# Patient Record
Sex: FEMALE | Race: WHITE
[De-identification: names, ages, dates, MRNs, and addresses within clinical notes are randomized per-mention and may not be internally consistent; named-entity substitution may affect disease eponyms.]

---

## 2017-12-02 NOTE — CT
CT OF ABDOMEN AND PELVIS PERFORMED WITH INTRAVENOUS CONTRAST ENHANCEMENT:

 

HISTORY: 

Patient with abdominal pain, dysuria, and urine odor that started 3 days ago.  Pain more in the lower
 abdomen.  History of cervical cancer.

 

FINDINGS: 

The lung bases show 2 right lower lobe pulmonary nodules both measuring in the 9 mm range.  There is 
a third nodule which has a central calcification similar in size in the right middle lobe.  No infilt
rative process.

 

The liver appears slightly enlarged.  It measures 25 cm in length but this is partially related to a 
somewhat elongated right lobe.  The spleen is within normal limits.  The pancreas is unremarkable.  A
 small gallstone is identified.

 

Right and left adrenal glands and right and left kidneys are normal in appearance.  There is no signi
ficant periaortic or mesenteric adenopathy.

 

CT OF PELVIS PERFORMED WITH CONTRAST ENHANCEMENT:

The bladder wall is diffusely thickened.  This could be on the basis of an infection or it could be p
ost radiation.  The uterus has been removed.  The appendix was normal.  No significant pelvic lymphad
enopathy.  Small nonspecific inguinal nodes are seen.

 

Slight scoliotic deformity to the spine is noted.

 

IMPRESSION: 

1.  Two noncalcified right lower lobe pulmonary nodules.  I did not have any previous exams available
 for comparison, but a report of a prior 2006 CT does not described any pulmonary nodules and given t
he history of cervical cancer, these have to be viewed with suspicion.  A complete CT of the chest wo
uld be recommended on an nonemergent basis.

2.  Bladder wall thickening could be on the basis of a cystitis.  It could be post radiation in natur
e.

3.  Borderline liver size.

4.  Small gallstone.

5.  Findings were telephoned to Dr. Quiroz at the time of this dictation.

 

 

CODE CR

 

POS: [facility]

## 2018-05-12 NOTE — RAD
AP VIEW OF THE CHEST:

5/12/18

 

INDICATION:

Weakness and dehydration and shortness of breath.

 

FINDINGS:  

There is low lung volumes. There is right basilar atelectasis. No consolidation, pleural effusion or 
pneumothorax evident. No acute osseous abnormality is evident.

 

IMPRESSION:  

Low lung volumes. Right basilar atelectasis. 

 

POS: [facility]

## 2018-06-25 NOTE — RAD
2 VIEWS CHEST:

 

Date:  06/25/18 

 

COMPARISON:  

11/06/17. 

 

HISTORY:  

Pain. 

 

FINDINGS:

No pneumothorax or pleural fluid. No focal consolidation or alveolar edema. Heart and mediastinal con
tours appear grossly unremarkable. 

 

There is a nodule identified within the anterior clear space on the lateral examination. A correlate 
is identified on the right, consistent with a right lower lobe pulmonary nodule, as seen on CT examin
ation performed 12/02/17. 

 

IMPRESSION: 

Right lower lobe pulmonary nodule. This should be further assessed with a complete chest CT on a none
mergent basis. This could be related to benign or malignant disease. No acute findings are seen. 

 

CODE T.

 

 

POS: [facility]

## 2018-06-25 NOTE — ULT
RIGHT UPPER QUADRANT ULTRASOUND: 

 

Date: 6-25-18 

 

Comparison: None. 

 

History: Right upper quadrant pain. 

 

Technique: Multiplanar grayscale sonographic imaging of the right upper quadrant provided. 

 

FINDINGS: 

Imaged pancreas is unremarkable. The pancreas is partially obscured by bowel gas. There is no focal l
iver lesion. The hepatic parenchyma is echogenic and heterogeneous suggesting hepatocellular disease,
 such as steatosis. 

 

The common bile duct measures 4 mm, within normal limits. 

 

There is no gallbladder wall thickening or pericholecystic fluid. The right kidney measures 12.1 cm i
n craniocaudal dimension and demonstrates no stone, hydronephrosis, or mass. 

 

Multiple small echogenic foci are noted within the gallbladder suggesting multiple small stones. 

 

Sonographic Ellison's sign is negative. 

 

IMPRESSION: 

1. Cholelithiasis with sonographic evidence of cholecystitis or biliary dilatation. Findings suggesti
ng probable hepatic steatosis. 

 

POS: [facility]

## 2019-04-18 NOTE — MMO
Bilateral MAMMO Bilat Diag [facility].

 

CLINICAL HISTORY:

Patient is 38 years old and is seen for diagnostic exam. The patient has the

following family history of breast cancer:  father.  The patient has a history

of cervical cancer.

 

VIEWS:

The views performed were:  bilateral craniocaudal with tomosynthesis; bilateral

mediolateral oblique with tomosynthesis; and bilateral mediolateral.

 

MAMMOGRAM FINDINGS:

There are scattered fibroglandular densities.

 

There are no suspicious masses, suspicious calcifications, or new areas of

architectural distortion.

 

IMPRESSION:

THERE IS NO MAMMOGRAPHIC EVIDENCE OF MALIGNANCY.

 

A ROUTINE FOLLOW-UP MAMMOGRAM IN 1 YEAR IS RECOMMENDED.

 

THE RESULTS OF THIS EXAM WERE SENT TO THE PATIENT.

 

ACR BI-RADS Category 1 - Negative

 

MAMMOGRAPHY NOTE:

 1. A negative mammogram report should not delay a biopsy if a dominant of

 clinically suspicious mass is present.

 2. Approximately 10% to 15% of breast cancers are not detected by

 mammography.

 3. Adenosis and dense breasts may obscure an underlying neoplasm.

## 2022-04-08 ENCOUNTER — HOSPITAL ENCOUNTER (EMERGENCY)
Dept: HOSPITAL 92 - ERS | Age: 42
LOS: 1 days | Discharge: HOME | End: 2022-04-09
Payer: SELF-PAY

## 2022-04-08 DIAGNOSIS — Z87.891: ICD-10-CM

## 2022-04-08 DIAGNOSIS — L02.412: Primary | ICD-10-CM

## 2022-04-08 DIAGNOSIS — E11.9: ICD-10-CM

## 2022-04-08 DIAGNOSIS — E03.9: ICD-10-CM

## 2022-04-08 DIAGNOSIS — Z85.41: ICD-10-CM

## 2022-04-08 PROCEDURE — 10060 I&D ABSCESS SIMPLE/SINGLE: CPT

## 2022-12-14 ENCOUNTER — HOSPITAL ENCOUNTER (EMERGENCY)
Dept: HOSPITAL 92 - ERS | Age: 42
Discharge: HOME | End: 2022-12-14
Payer: MEDICAID

## 2022-12-14 DIAGNOSIS — Z79.899: ICD-10-CM

## 2022-12-14 DIAGNOSIS — Z79.4: ICD-10-CM

## 2022-12-14 DIAGNOSIS — E03.9: ICD-10-CM

## 2022-12-14 DIAGNOSIS — Z87.891: ICD-10-CM

## 2022-12-14 DIAGNOSIS — E11.9: ICD-10-CM

## 2022-12-14 DIAGNOSIS — L02.212: Primary | ICD-10-CM

## 2022-12-14 PROCEDURE — 10060 I&D ABSCESS SIMPLE/SINGLE: CPT
